# Patient Record
Sex: MALE | Race: WHITE | URBAN - METROPOLITAN AREA
[De-identification: names, ages, dates, MRNs, and addresses within clinical notes are randomized per-mention and may not be internally consistent; named-entity substitution may affect disease eponyms.]

---

## 2024-03-24 ASSESSMENT — PATIENT HEALTH QUESTIONNAIRE - PHQ9
SUM OF ALL RESPONSES TO PHQ QUESTIONS 1-9: 2
2. FEELING DOWN, DEPRESSED OR HOPELESS: SEVERAL DAYS
1. LITTLE INTEREST OR PLEASURE IN DOING THINGS: SEVERAL DAYS
SUM OF ALL RESPONSES TO PHQ9 QUESTIONS 1 & 2: 2
SUM OF ALL RESPONSES TO PHQ QUESTIONS 1-9: 2
SUM OF ALL RESPONSES TO PHQ QUESTIONS 1-9: 2
1. LITTLE INTEREST OR PLEASURE IN DOING THINGS: SEVERAL DAYS
SUM OF ALL RESPONSES TO PHQ9 QUESTIONS 1 & 2: 2
SUM OF ALL RESPONSES TO PHQ QUESTIONS 1-9: 2
2. FEELING DOWN, DEPRESSED OR HOPELESS: SEVERAL DAYS

## 2024-03-25 ENCOUNTER — OFFICE VISIT (OUTPATIENT)
Dept: FAMILY MEDICINE CLINIC | Age: 39
End: 2024-03-25
Payer: COMMERCIAL

## 2024-03-25 VITALS
HEIGHT: 72 IN | OXYGEN SATURATION: 97 % | RESPIRATION RATE: 18 BRPM | HEART RATE: 88 BPM | DIASTOLIC BLOOD PRESSURE: 70 MMHG | SYSTOLIC BLOOD PRESSURE: 110 MMHG | TEMPERATURE: 97.9 F

## 2024-03-25 DIAGNOSIS — F17.200 TOBACCO DEPENDENCE: ICD-10-CM

## 2024-03-25 DIAGNOSIS — G89.29 CHRONIC RIGHT-SIDED LOW BACK PAIN WITHOUT SCIATICA: ICD-10-CM

## 2024-03-25 DIAGNOSIS — G43.709 CHRONIC MIGRAINE WITHOUT AURA WITHOUT STATUS MIGRAINOSUS, NOT INTRACTABLE: Primary | ICD-10-CM

## 2024-03-25 DIAGNOSIS — F51.04 PSYCHOPHYSIOLOGICAL INSOMNIA: ICD-10-CM

## 2024-03-25 DIAGNOSIS — M54.50 CHRONIC RIGHT-SIDED LOW BACK PAIN WITHOUT SCIATICA: ICD-10-CM

## 2024-03-25 DIAGNOSIS — F32.1 CURRENT MODERATE EPISODE OF MAJOR DEPRESSIVE DISORDER WITHOUT PRIOR EPISODE (HCC): ICD-10-CM

## 2024-03-25 PROCEDURE — 4004F PT TOBACCO SCREEN RCVD TLK: CPT | Performed by: FAMILY MEDICINE

## 2024-03-25 PROCEDURE — G8421 BMI NOT CALCULATED: HCPCS | Performed by: FAMILY MEDICINE

## 2024-03-25 PROCEDURE — 99204 OFFICE O/P NEW MOD 45 MIN: CPT | Performed by: FAMILY MEDICINE

## 2024-03-25 PROCEDURE — G8484 FLU IMMUNIZE NO ADMIN: HCPCS | Performed by: FAMILY MEDICINE

## 2024-03-25 PROCEDURE — G8427 DOCREV CUR MEDS BY ELIG CLIN: HCPCS | Performed by: FAMILY MEDICINE

## 2024-03-25 RX ORDER — SUMATRIPTAN 100 MG/1
100 TABLET, FILM COATED ORAL
Qty: 9 TABLET | Refills: 3 | Status: SHIPPED | OUTPATIENT
Start: 2024-03-25 | End: 2024-03-25

## 2024-03-25 RX ORDER — DOXEPIN HYDROCHLORIDE 10 MG/1
10 CAPSULE ORAL NIGHTLY
Qty: 30 CAPSULE | Refills: 3 | Status: SHIPPED | OUTPATIENT
Start: 2024-03-25

## 2024-03-25 RX ORDER — BUPROPION HYDROCHLORIDE 150 MG/1
150 TABLET ORAL EVERY MORNING
Qty: 30 TABLET | Refills: 0 | Status: SHIPPED | OUTPATIENT
Start: 2024-03-25

## 2024-03-25 RX ORDER — BUPROPION HYDROCHLORIDE 300 MG/1
300 TABLET ORAL EVERY MORNING
Qty: 30 TABLET | Refills: 3 | Status: SHIPPED | OUTPATIENT
Start: 2024-03-25

## 2024-03-25 SDOH — ECONOMIC STABILITY: FOOD INSECURITY: WITHIN THE PAST 12 MONTHS, THE FOOD YOU BOUGHT JUST DIDN'T LAST AND YOU DIDN'T HAVE MONEY TO GET MORE.: NEVER TRUE

## 2024-03-25 SDOH — ECONOMIC STABILITY: FOOD INSECURITY: WITHIN THE PAST 12 MONTHS, YOU WORRIED THAT YOUR FOOD WOULD RUN OUT BEFORE YOU GOT MONEY TO BUY MORE.: NEVER TRUE

## 2024-03-25 SDOH — ECONOMIC STABILITY: HOUSING INSECURITY
IN THE LAST 12 MONTHS, WAS THERE A TIME WHEN YOU DID NOT HAVE A STEADY PLACE TO SLEEP OR SLEPT IN A SHELTER (INCLUDING NOW)?: NO

## 2024-03-25 SDOH — ECONOMIC STABILITY: INCOME INSECURITY: HOW HARD IS IT FOR YOU TO PAY FOR THE VERY BASICS LIKE FOOD, HOUSING, MEDICAL CARE, AND HEATING?: NOT HARD AT ALL

## 2024-03-25 NOTE — PROGRESS NOTES
3/25/2024    Blood pressure 110/70, pulse 88, temperature 97.9 °F (36.6 °C), temperature source Oral, resp. rate 18, height 1.835 m (6' 0.25\"), SpO2 97 %.    Marcelo Short (:  1985) is a 38 y.o. male, here for evaluation of the following medical concerns:    Chief Complaint   Patient presents with    Annual Exam     Chronic lower back pain.  Would like a referral for PT  Would like to discuss medication to help quit smoking, migraine  Referral for Vasotomy.  Dry flaky scalp.  Insomnia and depression.     Not here in 12 yrs.  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 Another 11 yr old son who lives with his mother in [facility].    He has hx chronic migraines since age 7.  He still has migraines about once a week. Dehydration, missing meals, stress can be causes.  Typical migraine is intense pain behind L eye, light sensitivity, slight throbbing, no aura.  Usually lasts a day.  Rx used : ibu or tylenol or Excedrin  Has used his girlfriend's sumitriptan and that 'works well' at the 100 mg dose.    Still smokes- 3/4 ppd.  Started age 18.  Most the time just under a pack a day.  Has not quit during this time  Has not tried cessation aids.      Back injury at [employer] since injuring at [employer] 12-13 yrs ago.  Occ has radiation to R buttock and R upper leg.  Would like PT referral.  No weakness, numbness/tingling in leg (occ in R buttock) when he feels shooting pain to R buttock.  Does not take meds for it.    Reports poor mood  (since split with son's mother, Marleny) 8 yrs ago. No prior treatment for MDD  No SI  Has not been through counseling.  Not sleeping well. Hard to fall asleep.  Only since Marleny left him.  Had 'toxic' relationship with her.  Tried to make it work after she got pregnant.  He reports feeling some hopelessness, poor energy, self reproachful, sometimes upset.  Poor sleep. Poor concentration. Easily overwhelmed.    Planning to see dentist

## 2024-04-17 RX ORDER — BUPROPION HYDROCHLORIDE 150 MG/1
150 TABLET ORAL EVERY MORNING
Qty: 90 TABLET | Refills: 1 | OUTPATIENT
Start: 2024-04-17

## 2024-04-17 NOTE — TELEPHONE ENCOUNTER
Marcelo should now kali the 300mg dose of Wellbutrin (prescribed at the same time as the 150mg dose). I will cancel this refill. Please let him know.  Thanks.

## 2024-04-18 NOTE — TELEPHONE ENCOUNTER
Called and lvm for the patient that he should fill the 300 mg dosage, and that the 150 mg was canceled.

## 2024-05-10 ENCOUNTER — OFFICE VISIT (OUTPATIENT)
Dept: FAMILY MEDICINE CLINIC | Age: 39
End: 2024-05-10
Payer: COMMERCIAL

## 2024-05-10 VITALS
TEMPERATURE: 97.6 F | SYSTOLIC BLOOD PRESSURE: 102 MMHG | WEIGHT: 193.8 LBS | DIASTOLIC BLOOD PRESSURE: 60 MMHG | HEIGHT: 72 IN | OXYGEN SATURATION: 97 % | RESPIRATION RATE: 18 BRPM | BODY MASS INDEX: 26.25 KG/M2 | HEART RATE: 85 BPM

## 2024-05-10 DIAGNOSIS — L21.9 SEBORRHEIC DERMATITIS: ICD-10-CM

## 2024-05-10 DIAGNOSIS — F32.1 CURRENT MODERATE EPISODE OF MAJOR DEPRESSIVE DISORDER WITHOUT PRIOR EPISODE (HCC): Primary | ICD-10-CM

## 2024-05-10 DIAGNOSIS — F17.200 TOBACCO DEPENDENCE: ICD-10-CM

## 2024-05-10 DIAGNOSIS — F51.04 PSYCHOPHYSIOLOGICAL INSOMNIA: ICD-10-CM

## 2024-05-10 DIAGNOSIS — L20.9 ATOPIC DERMATITIS, UNSPECIFIED TYPE: ICD-10-CM

## 2024-05-10 PROCEDURE — G8427 DOCREV CUR MEDS BY ELIG CLIN: HCPCS | Performed by: FAMILY MEDICINE

## 2024-05-10 PROCEDURE — G8419 CALC BMI OUT NRM PARAM NOF/U: HCPCS | Performed by: FAMILY MEDICINE

## 2024-05-10 PROCEDURE — 99214 OFFICE O/P EST MOD 30 MIN: CPT | Performed by: FAMILY MEDICINE

## 2024-05-10 PROCEDURE — 4004F PT TOBACCO SCREEN RCVD TLK: CPT | Performed by: FAMILY MEDICINE

## 2024-05-10 RX ORDER — SUMATRIPTAN 100 MG/1
100 TABLET, FILM COATED ORAL
Qty: 9 TABLET | Refills: 3 | Status: SHIPPED | OUTPATIENT
Start: 2024-05-10 | End: 2024-05-10

## 2024-05-10 RX ORDER — KETOCONAZOLE 20 MG/ML
SHAMPOO TOPICAL
Qty: 120 ML | Refills: 1 | Status: SHIPPED | OUTPATIENT
Start: 2024-05-10

## 2024-05-10 RX ORDER — TRIAMCINOLONE ACETONIDE 5 MG/G
OINTMENT TOPICAL
Qty: 30 G | Refills: 1 | Status: SHIPPED | OUTPATIENT
Start: 2024-05-10 | End: 2024-05-17

## 2024-05-10 ASSESSMENT — PATIENT HEALTH QUESTIONNAIRE - PHQ9
4. FEELING TIRED OR HAVING LITTLE ENERGY: NOT AT ALL
SUM OF ALL RESPONSES TO PHQ QUESTIONS 1-9: 12
7. TROUBLE CONCENTRATING ON THINGS, SUCH AS READING THE NEWSPAPER OR WATCHING TELEVISION: NOT AT ALL
SUM OF ALL RESPONSES TO PHQ QUESTIONS 1-9: 12
6. FEELING BAD ABOUT YOURSELF - OR THAT YOU ARE A FAILURE OR HAVE LET YOURSELF OR YOUR FAMILY DOWN: NEARLY EVERY DAY
8. MOVING OR SPEAKING SO SLOWLY THAT OTHER PEOPLE COULD HAVE NOTICED. OR THE OPPOSITE, BEING SO FIGETY OR RESTLESS THAT YOU HAVE BEEN MOVING AROUND A LOT MORE THAN USUAL: SEVERAL DAYS
1. LITTLE INTEREST OR PLEASURE IN DOING THINGS: NEARLY EVERY DAY
9. THOUGHTS THAT YOU WOULD BE BETTER OFF DEAD, OR OF HURTING YOURSELF: NOT AT ALL
3. TROUBLE FALLING OR STAYING ASLEEP: MORE THAN HALF THE DAYS
SUM OF ALL RESPONSES TO PHQ QUESTIONS 1-9: 12
10. IF YOU CHECKED OFF ANY PROBLEMS, HOW DIFFICULT HAVE THESE PROBLEMS MADE IT FOR YOU TO DO YOUR WORK, TAKE CARE OF THINGS AT HOME, OR GET ALONG WITH OTHER PEOPLE: SOMEWHAT DIFFICULT
SUM OF ALL RESPONSES TO PHQ9 QUESTIONS 1 & 2: 6
2. FEELING DOWN, DEPRESSED OR HOPELESS: NEARLY EVERY DAY
5. POOR APPETITE OR OVEREATING: NOT AT ALL
SUM OF ALL RESPONSES TO PHQ QUESTIONS 1-9: 12

## 2024-05-10 NOTE — PROGRESS NOTES
5/10/2024    Blood pressure 102/60, pulse 85, temperature 97.6 °F (36.4 °C), temperature source Oral, resp. rate 18, height 1.832 m (6' 0.13\"), weight 87.9 kg (193 lb 12.8 oz), SpO2 97 %.    Marcelo Short (:  1985) is a 38 y.o. male, here for evaluation of the following medical concerns:    Chief Complaint   Patient presents with    Depression    Migraine     Sleep issues.     Here for f/u MDD, insomnia. With daughter, Denia.  Last visit 3/25/24    Likely depressed for many years. In teens and 20's had 'anger issues' but had no difficulty connecting with people.  (It got easier when I found alcohol).   Perhaps some social awkwardness in young adult.  Has good friends from childhood.  He thinks mood problems really started to affect him after separation from GF 8 yrs ago.  Went through an 18 month stressful custody snow.  Has had stressors also that have led to anxiety and depressed mood- like when ex GF moving out of state, taking their son with her.  Financial hardships.  Recently had to quit job to care for daughter.    Currently living with GF and their daughter in house.  Sharing expenses.    Last visit started Wellbutrin.  150mg, then 300mg (for past 10 days).  He says he is a little less anxious- felt OK to quit job to care for daughter.  No SE's.     Hs not started to talk to psychologist (yet)    He has been feeling too overwhelmed to make appt with psychologist, [facility].  Low motivation  He says he has 'always' been a procrastinator.  May have had ADHD in school - never dx.  School was hard. Didn't do work well.  Recalls his mom mentioned to pediatrician age 10 that he likely has ADHD (\"I was hyper\" and struggled with completing homework- but tested well so did not need treatment).  Presently can procrastinate, jump from task to task at home. Also is disorganized.  Forgaot to take wellbutrni medicine until he could set up a system.     He does not feel his depression is worse.    Feels

## 2024-06-05 RX ORDER — BUPROPION HYDROCHLORIDE 150 MG/1
150 TABLET ORAL EVERY MORNING
Qty: 30 TABLET | Refills: 0 | OUTPATIENT
Start: 2024-06-05

## 2024-06-21 ENCOUNTER — OFFICE VISIT (OUTPATIENT)
Dept: FAMILY MEDICINE CLINIC | Age: 39
End: 2024-06-21
Payer: COMMERCIAL

## 2024-06-21 VITALS
DIASTOLIC BLOOD PRESSURE: 60 MMHG | SYSTOLIC BLOOD PRESSURE: 104 MMHG | BODY MASS INDEX: 26.11 KG/M2 | TEMPERATURE: 97.7 F | HEART RATE: 74 BPM | RESPIRATION RATE: 18 BRPM | WEIGHT: 193.2 LBS | OXYGEN SATURATION: 98 %

## 2024-06-21 DIAGNOSIS — F32.1 CURRENT MODERATE EPISODE OF MAJOR DEPRESSIVE DISORDER WITHOUT PRIOR EPISODE (HCC): Primary | ICD-10-CM

## 2024-06-21 DIAGNOSIS — F51.04 PSYCHOPHYSIOLOGICAL INSOMNIA: ICD-10-CM

## 2024-06-21 DIAGNOSIS — F17.200 TOBACCO DEPENDENCE: ICD-10-CM

## 2024-06-21 PROCEDURE — 99214 OFFICE O/P EST MOD 30 MIN: CPT | Performed by: FAMILY MEDICINE

## 2024-06-21 PROCEDURE — G2211 COMPLEX E/M VISIT ADD ON: HCPCS | Performed by: FAMILY MEDICINE

## 2024-06-21 PROCEDURE — 4004F PT TOBACCO SCREEN RCVD TLK: CPT | Performed by: FAMILY MEDICINE

## 2024-06-21 PROCEDURE — G8419 CALC BMI OUT NRM PARAM NOF/U: HCPCS | Performed by: FAMILY MEDICINE

## 2024-06-21 PROCEDURE — G8427 DOCREV CUR MEDS BY ELIG CLIN: HCPCS | Performed by: FAMILY MEDICINE

## 2024-06-21 RX ORDER — SUMATRIPTAN 100 MG/1
100 TABLET, FILM COATED ORAL
Qty: 9 TABLET | Refills: 3 | Status: SHIPPED | OUTPATIENT
Start: 2024-06-21 | End: 2024-06-21

## 2024-06-21 RX ORDER — ESCITALOPRAM OXALATE 10 MG/1
10 TABLET ORAL DAILY
Qty: 30 TABLET | Refills: 3 | Status: SHIPPED | OUTPATIENT
Start: 2024-06-21

## 2024-06-21 NOTE — PROGRESS NOTES
2024    Blood pressure 104/60, pulse 74, temperature 97.7 °F (36.5 °C), temperature source Oral, resp. rate 18, weight 87.6 kg (193 lb 3.2 oz), SpO2 98 %.    Marcelo Short (:  1985) is a 38 y.o. male, here for evaluation of the following medical concerns:    Chief Complaint   Patient presents with    Follow-up    Depression     F/u MDD.  Last visit he had been at 300 mg dose of Wellbutrin only 10 days- had not noted any improvement  Still not noting much difference.    Sleep is 'a little easier' to fall asleep.  Does not wake after falling asleep  Thinks he gets at least 6 hrs nightly.    No SI. Occ brief thoughts of 'being dead'  and escape from stressors.  Main stressors: financial, son's mother (ex-GF)  Not working to care for daughter.  Car may need repairs.  Feels trapped.  Has GF howevere, who is supportive. Helping to get him an evening job or work from home job.    Alcohol use: rare  Still tobacco use 3/4 ppd.    Main symptom is hopelessness/pessimism.   He still feels that low energy and motivation is a key problem also  Can feel agitated OR lethargic  Appetite is normal  Struggles with negative self image  Worries a lot    Has not been on a serotonin med in the past.      Patient Active Problem List   Diagnosis    Tobacco dependence    Chronic migraine without aura without status migrainosus, not intractable    Chronic right-sided low back pain without sciatica    Current moderate episode of major depressive disorder without prior episode (HCC)    Psychophysiological insomnia    Seborrheic dermatitis    Atopic dermatitis        Body mass index is 26.11 kg/m².    Wt Readings from Last 3 Encounters:   24 87.6 kg (193 lb 3.2 oz)   05/10/24 87.9 kg (193 lb 12.8 oz)   12 78 kg (172 lb)       BP Readings from Last 3 Encounters:   24 104/60   05/10/24 102/60   24 110/70       No Known Allergies    Prior to Visit Medications    Medication Sig Taking? Authorizing Provider

## 2024-08-05 RX ORDER — ESCITALOPRAM OXALATE 10 MG/1
10 TABLET ORAL DAILY
Qty: 90 TABLET | Refills: 1 | Status: SHIPPED | OUTPATIENT
Start: 2024-08-05

## 2024-08-20 ENCOUNTER — OFFICE VISIT (OUTPATIENT)
Dept: FAMILY MEDICINE CLINIC | Age: 39
End: 2024-08-20
Payer: COMMERCIAL

## 2024-08-20 VITALS
OXYGEN SATURATION: 98 % | SYSTOLIC BLOOD PRESSURE: 104 MMHG | DIASTOLIC BLOOD PRESSURE: 60 MMHG | BODY MASS INDEX: 25.63 KG/M2 | RESPIRATION RATE: 18 BRPM | HEART RATE: 68 BPM | WEIGHT: 189.6 LBS

## 2024-08-20 DIAGNOSIS — F51.04 PSYCHOPHYSIOLOGICAL INSOMNIA: ICD-10-CM

## 2024-08-20 DIAGNOSIS — L21.9 SEBORRHEIC DERMATITIS: ICD-10-CM

## 2024-08-20 DIAGNOSIS — F32.1 CURRENT MODERATE EPISODE OF MAJOR DEPRESSIVE DISORDER WITHOUT PRIOR EPISODE (HCC): Primary | ICD-10-CM

## 2024-08-20 DIAGNOSIS — G89.29 CHRONIC RIGHT-SIDED LOW BACK PAIN WITHOUT SCIATICA: ICD-10-CM

## 2024-08-20 DIAGNOSIS — M54.50 CHRONIC RIGHT-SIDED LOW BACK PAIN WITHOUT SCIATICA: ICD-10-CM

## 2024-08-20 PROCEDURE — G8419 CALC BMI OUT NRM PARAM NOF/U: HCPCS | Performed by: FAMILY MEDICINE

## 2024-08-20 PROCEDURE — G8427 DOCREV CUR MEDS BY ELIG CLIN: HCPCS | Performed by: FAMILY MEDICINE

## 2024-08-20 PROCEDURE — 99214 OFFICE O/P EST MOD 30 MIN: CPT | Performed by: FAMILY MEDICINE

## 2024-08-20 PROCEDURE — 4004F PT TOBACCO SCREEN RCVD TLK: CPT | Performed by: FAMILY MEDICINE

## 2024-08-20 RX ORDER — KETOCONAZOLE 20 MG/ML
SHAMPOO TOPICAL
Qty: 120 ML | Refills: 1 | Status: SHIPPED | OUTPATIENT
Start: 2024-08-20

## 2024-08-20 RX ORDER — SUMATRIPTAN 100 MG/1
100 TABLET, FILM COATED ORAL
Qty: 9 TABLET | Refills: 3 | Status: SHIPPED | OUTPATIENT
Start: 2024-08-20 | End: 2024-08-20

## 2024-08-20 RX ORDER — TACROLIMUS 0.3 MG/G
OINTMENT TOPICAL
Qty: 60 G | Refills: 1 | Status: SHIPPED | OUTPATIENT
Start: 2024-08-20

## 2024-08-20 NOTE — PROGRESS NOTES
h    2024    Blood pressure 104/60, pulse 68, resp. rate 18, weight 86 kg (189 lb 9.6 oz), SpO2 98%.    Marcelo Short (:  1985) is a 39 y.o. male, here for evaluation of the following medical concerns:    Chief Complaint   Patient presents with    Follow-up    Depression     Here for routine follow up of MDD  Last visit he was still very stressed and depressed.  He says life is still stressful - finances, auto break down.  But he is now optimistic and less 'low' when encountering challenges.  He no longer ruminates finances or his son's mother, who is just not a nice person to deal with.      But that is not current relationship with his daughter's mother, whom he has today and cares for full time.    Looking for remote work, evening work.    He is gigging as a .  He is eating better because 'i'm broke' (so not eating out)    He is taking Wellbutrin 300 and Lexapro 10 mg  Denies SE's  No SI  Sleep has been improving. Easier to fall asleep.   Not using sleep meds.  Able to get up out of bed more easily.    Feels more rested perhaps- mentally (not physically- back still aches).  He has hx of RLS and it flares up rarely- unsure of trigger.  Only once in past few months.    Some decrease in tobacco use- down to 2 packs weekly.  About 4 daily.  (Is vaping nicotine)    He still has itchy scalp and scabbing despite Nizoral use daily for a month. (When he stopped it did increase though)    Patient Active Problem List   Diagnosis    Tobacco dependence    Chronic migraine without aura without status migrainosus, not intractable    Chronic right-sided low back pain without sciatica    Current moderate episode of major depressive disorder without prior episode (HCC)    Psychophysiological insomnia    Seborrheic dermatitis    Atopic dermatitis        Body mass index is 25.63 kg/m².    Wt Readings from Last 3 Encounters:   24 86 kg (189 lb 9.6 oz)   24 87.6 kg (193 lb 3.2 oz)   05/10/24 87.9 kg

## 2024-08-21 ENCOUNTER — TELEPHONE (OUTPATIENT)
Dept: ADMINISTRATIVE | Age: 39
End: 2024-08-21

## 2024-08-21 NOTE — TELEPHONE ENCOUNTER
Submitted PA for Tacrolimus 0.03% ointment   Via CMM Key: [id number]  STATUS: not sent    I need last office note completed so I can send with the PA.Please advise.    If this requires a response please respond to the pool. (P [facility] Pre-Auth).    Please advise patient thank you.

## 2024-09-03 RX ORDER — BUPROPION HYDROCHLORIDE 300 MG/1
300 TABLET ORAL EVERY MORNING
Qty: 90 TABLET | Refills: 1 | Status: SHIPPED | OUTPATIENT
Start: 2024-09-03

## 2024-09-23 RX ORDER — BUPROPION HYDROCHLORIDE 150 MG/1
150 TABLET ORAL EVERY MORNING
Qty: 30 TABLET | Refills: 0 | OUTPATIENT
Start: 2024-09-23

## 2025-01-15 ENCOUNTER — OFFICE VISIT (OUTPATIENT)
Dept: ENT CLINIC | Age: 40
End: 2025-01-15

## 2025-01-15 VITALS
WEIGHT: 209 LBS | TEMPERATURE: 98 F | OXYGEN SATURATION: 99 % | SYSTOLIC BLOOD PRESSURE: 106 MMHG | HEIGHT: 72 IN | BODY MASS INDEX: 28.31 KG/M2 | HEART RATE: 88 BPM | DIASTOLIC BLOOD PRESSURE: 72 MMHG

## 2025-01-15 DIAGNOSIS — R13.10 DYSPHAGIA, UNSPECIFIED TYPE: ICD-10-CM

## 2025-01-15 DIAGNOSIS — J35.8 CYST OF TONSIL: ICD-10-CM

## 2025-01-15 DIAGNOSIS — J35.8 ASYMMETRIC TONSILS: Primary | ICD-10-CM

## 2025-01-15 NOTE — PROGRESS NOTES
[facility]  DIVISION OF OTOLARYNGOLOGY- HEAD & NECK SURGERY  CONSULT      Marcelo Short (:  1985) is a 39 y.o. male, here for evaluation of the following chief complaint(s):  New Patient (Pt stated that his dentist noticed that he has a growth on his left tonsil. Pt stated that it does not cause him any pain at this time. Pt is unsure how long it has been there but stated that when he got a full work up at his pcp office in 2024 it was not there that he knows of )      ASSESSMENT/PLAN:  1. Asymmetric tonsils  2. Cyst of tonsil  3. Dysphagia, unspecified type      This is a very pleasant 39 y.o. male here today for evaluation of the the above-noted complaints.      On exam, the patient has asymmetry of the tonsils.  There appears to be a benign mucous retention cyst of the superior medial aspect of the left tonsil which is contributing to the majority of the asymmetry.  On flexible laryngoscopy, there is no evidence of underlying mass effect of the tonsil.  I discussed with the patient different treatment options including surgical removal, imaging versus observation.  As he has no symptoms, and is benign-appearing, we discussed close observation to ensure that is not enlarging.  If it is we will obtain imaging and biopsy.      Medical Decision Making:  The following items were considered in medical decision making:  Independent review of images  Review / order clinical lab tests  Review / order radiology tests  Decision to obtain old records  Review and summation of old records as accessed through [facility] if applicable    SUBJECTIVE/OBJECTIVE:  HPI    Marcelo Short is here today for evaluation of tonsillar asymmetry.  He was referred by his dentist for a lesion noted of the left tonsil.      Patient reports he has no cough, shortness of breath, hemoptysis or neck mass.  He does report history of smoking for many years.  He is now down to half pack per day.  He does have a family member with a

## 2025-02-17 SDOH — ECONOMIC STABILITY: TRANSPORTATION INSECURITY
IN THE PAST 12 MONTHS, HAS THE LACK OF TRANSPORTATION KEPT YOU FROM MEDICAL APPOINTMENTS OR FROM GETTING MEDICATIONS?: NO

## 2025-02-17 SDOH — ECONOMIC STABILITY: TRANSPORTATION INSECURITY
IN THE PAST 12 MONTHS, HAS LACK OF TRANSPORTATION KEPT YOU FROM MEETINGS, WORK, OR FROM GETTING THINGS NEEDED FOR DAILY LIVING?: NO

## 2025-02-17 SDOH — ECONOMIC STABILITY: FOOD INSECURITY: WITHIN THE PAST 12 MONTHS, YOU WORRIED THAT YOUR FOOD WOULD RUN OUT BEFORE YOU GOT MONEY TO BUY MORE.: NEVER TRUE

## 2025-02-17 SDOH — ECONOMIC STABILITY: FOOD INSECURITY: WITHIN THE PAST 12 MONTHS, THE FOOD YOU BOUGHT JUST DIDN'T LAST AND YOU DIDN'T HAVE MONEY TO GET MORE.: NEVER TRUE

## 2025-02-17 SDOH — ECONOMIC STABILITY: INCOME INSECURITY: IN THE LAST 12 MONTHS, WAS THERE A TIME WHEN YOU WERE NOT ABLE TO PAY THE MORTGAGE OR RENT ON TIME?: NO

## 2025-02-17 ASSESSMENT — PATIENT HEALTH QUESTIONNAIRE - PHQ9
3. TROUBLE FALLING OR STAYING ASLEEP: SEVERAL DAYS
SUM OF ALL RESPONSES TO PHQ QUESTIONS 1-9: 2
SUM OF ALL RESPONSES TO PHQ9 QUESTIONS 1 & 2: 0
5. POOR APPETITE OR OVEREATING: NOT AT ALL
SUM OF ALL RESPONSES TO PHQ QUESTIONS 1-9: 2
1. LITTLE INTEREST OR PLEASURE IN DOING THINGS: NOT AT ALL
7. TROUBLE CONCENTRATING ON THINGS, SUCH AS READING THE NEWSPAPER OR WATCHING TELEVISION: NOT AT ALL
1. LITTLE INTEREST OR PLEASURE IN DOING THINGS: NOT AT ALL
4. FEELING TIRED OR HAVING LITTLE ENERGY: SEVERAL DAYS
9. THOUGHTS THAT YOU WOULD BE BETTER OFF DEAD, OR OF HURTING YOURSELF: NOT AT ALL
5. POOR APPETITE OR OVEREATING: NOT AT ALL
3. TROUBLE FALLING OR STAYING ASLEEP: SEVERAL DAYS
6. FEELING BAD ABOUT YOURSELF - OR THAT YOU ARE A FAILURE OR HAVE LET YOURSELF OR YOUR FAMILY DOWN: NOT AT ALL
4. FEELING TIRED OR HAVING LITTLE ENERGY: SEVERAL DAYS
SUM OF ALL RESPONSES TO PHQ QUESTIONS 1-9: 2
SUM OF ALL RESPONSES TO PHQ QUESTIONS 1-9: 2
9. THOUGHTS THAT YOU WOULD BE BETTER OFF DEAD, OR OF HURTING YOURSELF: NOT AT ALL
7. TROUBLE CONCENTRATING ON THINGS, SUCH AS READING THE NEWSPAPER OR WATCHING TELEVISION: NOT AT ALL
10. IF YOU CHECKED OFF ANY PROBLEMS, HOW DIFFICULT HAVE THESE PROBLEMS MADE IT FOR YOU TO DO YOUR WORK, TAKE CARE OF THINGS AT HOME, OR GET ALONG WITH OTHER PEOPLE: NOT DIFFICULT AT ALL
6. FEELING BAD ABOUT YOURSELF - OR THAT YOU ARE A FAILURE OR HAVE LET YOURSELF OR YOUR FAMILY DOWN: NOT AT ALL
8. MOVING OR SPEAKING SO SLOWLY THAT OTHER PEOPLE COULD HAVE NOTICED. OR THE OPPOSITE - BEING SO FIDGETY OR RESTLESS THAT YOU HAVE BEEN MOVING AROUND A LOT MORE THAN USUAL: NOT AT ALL
SUM OF ALL RESPONSES TO PHQ QUESTIONS 1-9: 2
10. IF YOU CHECKED OFF ANY PROBLEMS, HOW DIFFICULT HAVE THESE PROBLEMS MADE IT FOR YOU TO DO YOUR WORK, TAKE CARE OF THINGS AT HOME, OR GET ALONG WITH OTHER PEOPLE: NOT DIFFICULT AT ALL
2. FEELING DOWN, DEPRESSED OR HOPELESS: NOT AT ALL
2. FEELING DOWN, DEPRESSED OR HOPELESS: NOT AT ALL
8. MOVING OR SPEAKING SO SLOWLY THAT OTHER PEOPLE COULD HAVE NOTICED. OR THE OPPOSITE, BEING SO FIGETY OR RESTLESS THAT YOU HAVE BEEN MOVING AROUND A LOT MORE THAN USUAL: NOT AT ALL

## 2025-02-20 ENCOUNTER — OFFICE VISIT (OUTPATIENT)
Dept: FAMILY MEDICINE CLINIC | Age: 40
End: 2025-02-20
Payer: COMMERCIAL

## 2025-02-20 VITALS
OXYGEN SATURATION: 98 % | WEIGHT: 206.2 LBS | SYSTOLIC BLOOD PRESSURE: 102 MMHG | DIASTOLIC BLOOD PRESSURE: 64 MMHG | RESPIRATION RATE: 16 BRPM | HEART RATE: 96 BPM | HEIGHT: 72 IN | BODY MASS INDEX: 27.93 KG/M2

## 2025-02-20 DIAGNOSIS — G43.709 CHRONIC MIGRAINE WITHOUT AURA WITHOUT STATUS MIGRAINOSUS, NOT INTRACTABLE: ICD-10-CM

## 2025-02-20 DIAGNOSIS — R53.83 FATIGUE, UNSPECIFIED TYPE: ICD-10-CM

## 2025-02-20 DIAGNOSIS — F17.200 TOBACCO DEPENDENCE: ICD-10-CM

## 2025-02-20 DIAGNOSIS — F51.04 PSYCHOPHYSIOLOGICAL INSOMNIA: ICD-10-CM

## 2025-02-20 DIAGNOSIS — F32.1 CURRENT MODERATE EPISODE OF MAJOR DEPRESSIVE DISORDER WITHOUT PRIOR EPISODE (HCC): Primary | ICD-10-CM

## 2025-02-20 LAB
25(OH)D3 SERPL-MCNC: 29.4 NG/ML
ALBUMIN SERPL-MCNC: 4.4 G/DL (ref 3.4–5)
ALBUMIN/GLOB SERPL: 1.6 {RATIO} (ref 1.1–2.2)
ALP SERPL-CCNC: 103 U/L (ref 40–129)
ALT SERPL-CCNC: 42 U/L (ref 10–40)
ANION GAP SERPL CALCULATED.3IONS-SCNC: 9 MMOL/L (ref 3–16)
AST SERPL-CCNC: 33 U/L (ref 15–37)
BASOPHILS # BLD: 0.1 K/UL (ref 0–0.2)
BASOPHILS NFR BLD: 1.3 %
BILIRUB SERPL-MCNC: 0.3 MG/DL (ref 0–1)
BUN SERPL-MCNC: 13 MG/DL (ref 7–20)
CALCIUM SERPL-MCNC: 9.8 MG/DL (ref 8.3–10.6)
CHLORIDE SERPL-SCNC: 104 MMOL/L (ref 99–110)
CO2 SERPL-SCNC: 29 MMOL/L (ref 21–32)
CREAT SERPL-MCNC: 1.1 MG/DL (ref 0.9–1.3)
DEPRECATED RDW RBC AUTO: 14.1 % (ref 12.4–15.4)
EOSINOPHIL # BLD: 0.2 K/UL (ref 0–0.6)
EOSINOPHIL NFR BLD: 2.3 %
FOLATE SERPL-MCNC: >40 NG/ML (ref 4.78–24.2)
GFR SERPLBLD CREATININE-BSD FMLA CKD-EPI: 88 ML/MIN/{1.73_M2}
GLUCOSE SERPL-MCNC: 54 MG/DL (ref 70–99)
HCT VFR BLD AUTO: 45.2 % (ref 40.5–52.5)
HGB BLD-MCNC: 15 G/DL (ref 13.5–17.5)
IRON SATN MFR SERPL: 25 % (ref 20–50)
IRON SERPL-MCNC: 76 UG/DL (ref 59–158)
LYMPHOCYTES # BLD: 2.3 K/UL (ref 1–5.1)
LYMPHOCYTES NFR BLD: 27 %
MCH RBC QN AUTO: 32 PG (ref 26–34)
MCHC RBC AUTO-ENTMCNC: 33.3 G/DL (ref 31–36)
MCV RBC AUTO: 96.3 FL (ref 80–100)
MONOCYTES # BLD: 0.9 K/UL (ref 0–1.3)
MONOCYTES NFR BLD: 11.1 %
NEUTROPHILS # BLD: 5 K/UL (ref 1.7–7.7)
NEUTROPHILS NFR BLD: 58.3 %
PLATELET # BLD AUTO: 202 K/UL (ref 135–450)
PMV BLD AUTO: 11.2 FL (ref 5–10.5)
POTASSIUM SERPL-SCNC: 4.3 MMOL/L (ref 3.5–5.1)
PROT SERPL-MCNC: 7.1 G/DL (ref 6.4–8.2)
RBC # BLD AUTO: 4.69 M/UL (ref 4.2–5.9)
SODIUM SERPL-SCNC: 142 MMOL/L (ref 136–145)
TIBC SERPL-MCNC: 300 UG/DL (ref 260–445)
TSH SERPL DL<=0.005 MIU/L-ACNC: 1.63 UIU/ML (ref 0.27–4.2)
VIT B12 SERPL-MCNC: 1084 PG/ML (ref 211–911)
WBC # BLD AUTO: 8.5 K/UL (ref 4–11)

## 2025-02-20 PROCEDURE — G8419 CALC BMI OUT NRM PARAM NOF/U: HCPCS | Performed by: FAMILY MEDICINE

## 2025-02-20 PROCEDURE — 4004F PT TOBACCO SCREEN RCVD TLK: CPT | Performed by: FAMILY MEDICINE

## 2025-02-20 PROCEDURE — G8427 DOCREV CUR MEDS BY ELIG CLIN: HCPCS | Performed by: FAMILY MEDICINE

## 2025-02-20 PROCEDURE — G2211 COMPLEX E/M VISIT ADD ON: HCPCS | Performed by: FAMILY MEDICINE

## 2025-02-20 PROCEDURE — 99214 OFFICE O/P EST MOD 30 MIN: CPT | Performed by: FAMILY MEDICINE

## 2025-02-20 RX ORDER — ESCITALOPRAM OXALATE 10 MG/1
10 TABLET ORAL DAILY
Qty: 90 TABLET | Refills: 1 | Status: SHIPPED | OUTPATIENT
Start: 2025-02-20

## 2025-02-20 RX ORDER — BUPROPION HYDROCHLORIDE 300 MG/1
300 TABLET ORAL EVERY MORNING
Qty: 90 TABLET | Refills: 1 | Status: SHIPPED | OUTPATIENT
Start: 2025-02-20

## 2025-02-20 SDOH — ECONOMIC STABILITY: FOOD INSECURITY: WITHIN THE PAST 12 MONTHS, THE FOOD YOU BOUGHT JUST DIDN'T LAST AND YOU DIDN'T HAVE MONEY TO GET MORE.: NEVER TRUE

## 2025-02-20 SDOH — ECONOMIC STABILITY: FOOD INSECURITY: WITHIN THE PAST 12 MONTHS, YOU WORRIED THAT YOUR FOOD WOULD RUN OUT BEFORE YOU GOT MONEY TO BUY MORE.: NEVER TRUE

## 2025-02-20 NOTE — PATIENT INSTRUCTIONS
Seroquel:   50 mg at bedtime.   If no effect, double dose to 100 mg in 3 days.  Call me if this is STILL not helping sleep.

## 2025-02-20 NOTE — ASSESSMENT & PLAN NOTE
- mood still not optimized.  - insomnia likely a cause and an effect.  - add Seroquel 50 qhs for sleep and mood.  - continue Wellbutrin and Lexapro for now     Defers counseling

## 2025-02-20 NOTE — PROGRESS NOTES
2025    Blood pressure 102/64, pulse 96, resp. rate 16, height 1.829 m (6'), weight 93.5 kg (206 lb 3.2 oz), SpO2 98%.    Marcelo Short (:  1985) is a 39 y.o. male, here for evaluation of the following medical concerns:    Chief Complaint   Patient presents with    Depression     Pt has been less motivated and little more tired      Here for routine follow up of MDD.  Current episode started about a year ago.  Feels he has been depressed off and on all his adult life.  Sensitive to stressors- jamie domestic, - wife left him.   (She and their son live in [facility])  Started Wellbutrin 3/24  Added Lexapro   In aug, mood was improving and we decided to keep that treatment going (cielo 10 and wellbut 300)    So today:   Still looking for work  Still smoking (but down to 1/4 ppd), also vapes nicotine to replace- (not as pleasing as cigarettes).  Wellbutrin has not helped.  Has not tried Chantix.    He is still living with GF and their daughter. He talks with GR about how he feels.  She has shared with him that he seems more tired, less motivated.  He often feels irritable/edgy.    He loses appetite when more canseco  Smokes more when he is more stressed.      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    He has not had labs here.    No SI  Sleeps poorly (since his GF lost her job and finances became more strained, making their relationship more tense)    Not talking to a therapist.    He reports 'the normal' amount of migraines- 1-2 a week.   Imitrex helps.  Headaches are affected by dehydration and changing caffeine intake.        Patient Active Problem List   Diagnosis    Tobacco dependence    Chronic migraine without aura without status migrainosus, not intractable    Chronic right-sided low back pain without sciatica    Current moderate episode of major depressive disorder without prior episode (HCC)    Psychophysiological insomnia

## 2025-02-20 NOTE — ASSESSMENT & PLAN NOTE
- high frequency. Not on preventive Rx yet.  - hopefully addressing sleep with Seroquel will have a beneficial effect on headaches.

## 2025-04-15 ENCOUNTER — PREP FOR PROCEDURE (OUTPATIENT)
Dept: ENT CLINIC | Age: 40
End: 2025-04-15

## 2025-04-15 ENCOUNTER — OFFICE VISIT (OUTPATIENT)
Dept: ENT CLINIC | Age: 40
End: 2025-04-15
Payer: COMMERCIAL

## 2025-04-15 VITALS
DIASTOLIC BLOOD PRESSURE: 78 MMHG | HEART RATE: 73 BPM | SYSTOLIC BLOOD PRESSURE: 115 MMHG | BODY MASS INDEX: 29.26 KG/M2 | OXYGEN SATURATION: 99 % | WEIGHT: 216 LBS | HEIGHT: 72 IN

## 2025-04-15 DIAGNOSIS — J35.9 LESION OF TONSIL: ICD-10-CM

## 2025-04-15 DIAGNOSIS — J35.8 ASYMMETRIC TONSILS: Primary | ICD-10-CM

## 2025-04-15 DIAGNOSIS — R13.10 DYSPHAGIA, UNSPECIFIED TYPE: ICD-10-CM

## 2025-04-15 DIAGNOSIS — J35.8 CYST OF TONSIL: ICD-10-CM

## 2025-04-15 PROCEDURE — 4004F PT TOBACCO SCREEN RCVD TLK: CPT | Performed by: STUDENT IN AN ORGANIZED HEALTH CARE EDUCATION/TRAINING PROGRAM

## 2025-04-15 PROCEDURE — 99214 OFFICE O/P EST MOD 30 MIN: CPT | Performed by: STUDENT IN AN ORGANIZED HEALTH CARE EDUCATION/TRAINING PROGRAM

## 2025-04-15 PROCEDURE — G8419 CALC BMI OUT NRM PARAM NOF/U: HCPCS | Performed by: STUDENT IN AN ORGANIZED HEALTH CARE EDUCATION/TRAINING PROGRAM

## 2025-04-15 PROCEDURE — G8427 DOCREV CUR MEDS BY ELIG CLIN: HCPCS | Performed by: STUDENT IN AN ORGANIZED HEALTH CARE EDUCATION/TRAINING PROGRAM

## 2025-04-15 RX ORDER — SODIUM CHLORIDE 0.9 % (FLUSH) 0.9 %
5-40 SYRINGE (ML) INJECTION EVERY 12 HOURS SCHEDULED
Status: CANCELLED | OUTPATIENT
Start: 2025-04-15

## 2025-04-15 RX ORDER — SODIUM CHLORIDE 9 MG/ML
INJECTION, SOLUTION INTRAVENOUS PRN
Status: CANCELLED | OUTPATIENT
Start: 2025-04-15

## 2025-04-15 RX ORDER — SODIUM CHLORIDE 0.9 % (FLUSH) 0.9 %
5-40 SYRINGE (ML) INJECTION PRN
Status: CANCELLED | OUTPATIENT
Start: 2025-04-15

## 2025-04-15 NOTE — PROGRESS NOTES
his throat and he can feel it while swallowing.  No pain.  No lumps or bumps of the head and neck.    REVIEW OF SYSTEMS  The following systems were reviewed and revealed the following in addition to any already discussed in the HPI:    PHYSICAL EXAM    GENERAL: No acute distress, alert and oriented, no hoarseness, strong voice  EYES: EOMI, Anti-icteric  HENT:   Head: Normocephalic and atraumatic.   Face:  Symmetric, facial nerve intact  Right Ear: Normal external ear, normal external auditory canal, intact tympanic membrane with normal mobility and aerated middle ear  Left Ear: Normal external ear, normal external auditory canal, intact tympanic membrane with normal mobility and aerated middle ear  Mouth/Oral Cavity:  normal lips, Uvula is midline, no mucosal lesions, no trismus, normal dentition, normal salivary quality/flow  Oropharynx/Larynx:  normal oropharynx, asymmetry of tonsils, left tonsil larger than right with cystic lesion along the superior medial aspect.  Stable in size  Nose:Normal external nasal appearance.  Normal mucosa   NECK: Normal range of motion, no thyromegaly, trachea is midline, no lymphadenopathy, no neck masses, no crepitus  CHEST: Normal respiratory effort, no retractions, breathing comfortably  SKIN: No rashes, normal appearing skin, no evidence of skin lesions/tumors  Neuro:  cranial nerve II-XII intact; normal gait  Cardio:  no edema        PROCEDURE  Flexible Laryngoscopy CPT Code 62777 from prior visit, no charge    Pre op: Dysphagia, possible tonsil mass.   Post op: Same  Procedure : Flexible Laryngoscopy  Surgeon: Jovanni Ramirez MD  Anesthesia: Afrin with 4% lidocaine  Indication: Laryngeal mirror examination was not tolerated due to gag reflex  Description:  The scope was passed along the floor of the right naris to the level of the larynx. There was no evidence of concerning masses or lesions of the base of tongue, vallecula, epiglottis, aryepiglottic folds, arytenoids, false vocal

## 2025-05-20 ENCOUNTER — OFFICE VISIT (OUTPATIENT)
Dept: FAMILY MEDICINE CLINIC | Age: 40
End: 2025-05-20
Payer: COMMERCIAL

## 2025-05-20 VITALS
SYSTOLIC BLOOD PRESSURE: 120 MMHG | BODY MASS INDEX: 28.58 KG/M2 | RESPIRATION RATE: 16 BRPM | WEIGHT: 211 LBS | HEIGHT: 72 IN | OXYGEN SATURATION: 98 % | HEART RATE: 84 BPM | DIASTOLIC BLOOD PRESSURE: 78 MMHG

## 2025-05-20 DIAGNOSIS — F17.200 TOBACCO DEPENDENCE: ICD-10-CM

## 2025-05-20 DIAGNOSIS — F32.1 CURRENT MODERATE EPISODE OF MAJOR DEPRESSIVE DISORDER WITHOUT PRIOR EPISODE (HCC): Primary | ICD-10-CM

## 2025-05-20 DIAGNOSIS — G43.709 CHRONIC MIGRAINE WITHOUT AURA WITHOUT STATUS MIGRAINOSUS, NOT INTRACTABLE: ICD-10-CM

## 2025-05-20 PROCEDURE — G8419 CALC BMI OUT NRM PARAM NOF/U: HCPCS | Performed by: FAMILY MEDICINE

## 2025-05-20 PROCEDURE — G8427 DOCREV CUR MEDS BY ELIG CLIN: HCPCS | Performed by: FAMILY MEDICINE

## 2025-05-20 PROCEDURE — 4004F PT TOBACCO SCREEN RCVD TLK: CPT | Performed by: FAMILY MEDICINE

## 2025-05-20 PROCEDURE — 99214 OFFICE O/P EST MOD 30 MIN: CPT | Performed by: FAMILY MEDICINE

## 2025-05-20 NOTE — PROGRESS NOTES
2025    Blood pressure 120/78, pulse 84, resp. rate 16, height 1.829 m (6'), weight 95.7 kg (211 lb), SpO2 98%.    Marcelo Short (:  1985) is a 39 y.o. male, here for evaluation of the following medical concerns:    Chief Complaint   Patient presents with    Depression     HPI    # Depression  Pt is taking Wellbutrin. Feels stabilized and without as much dread but not taking daily. GF new started job. He has working remotely overnight 10 PM - 4 AM. Erratic sleep due to schedule and stressed by recent financial constrains. Feels worried by political state of [place] especially since son is on the spectrum. More anxious and irritable in last several weeks. Avoids or dislikes tasks requiring sustained mental efforts. Difficulty staying organized.     # Tobacco  Feels more of the need and craving to smoke more. 1-2 cigarettes during the day. 1/2 pk to 3/4 pk a day. Smokes outdoors. Interested in NRT in addition to the Wellbutrin. Has been having some increased effort when laying down to breathe. Thinks it could be due to cyst on tonsil. Is currently rescheduling with ENT for removal. Not changed in size per last ENT f/u.     # Migraines  Sumatriptan keeps well controlled. More frequent headaches but attributes to lack of eating, changes in caffeine intake, hydration and sleep schedule. Is finishing his medication for the month before end of month.       Patient Active Problem List   Diagnosis    Tobacco dependence    Chronic migraine without aura without status migrainosus, not intractable    Chronic right-sided low back pain without sciatica    Current moderate episode of major depressive disorder without prior episode (HCC)    Psychophysiological insomnia    Seborrheic dermatitis    Atopic dermatitis    Lesion of tonsil        Body mass index is 28.62 kg/m².    Wt Readings from Last 3 Encounters:   25 95.7 kg (211 lb)   04/15/25 98 kg (216 lb)   25 93.5 kg (206 lb 3.2 oz)       BP

## 2025-07-16 RX ORDER — SUMATRIPTAN SUCCINATE 100 MG/1
100 TABLET ORAL
Qty: 9 TABLET | Refills: 3 | Status: SHIPPED | OUTPATIENT
Start: 2025-07-16